# Patient Record
Sex: MALE | Race: WHITE | Employment: FULL TIME | RURAL
[De-identification: names, ages, dates, MRNs, and addresses within clinical notes are randomized per-mention and may not be internally consistent; named-entity substitution may affect disease eponyms.]

---

## 2024-01-06 ENCOUNTER — HOSPITAL ENCOUNTER (EMERGENCY)
Facility: HOSPITAL | Age: 54
Discharge: HOME | End: 2024-01-06
Attending: EMERGENCY MEDICINE
Payer: COMMERCIAL

## 2024-01-06 ENCOUNTER — APPOINTMENT (OUTPATIENT)
Facility: HOSPITAL | Age: 54
End: 2024-01-06
Payer: COMMERCIAL

## 2024-01-06 VITALS
TEMPERATURE: 99.2 F | HEART RATE: 99 BPM | DIASTOLIC BLOOD PRESSURE: 100 MMHG | OXYGEN SATURATION: 98 % | SYSTOLIC BLOOD PRESSURE: 175 MMHG | RESPIRATION RATE: 18 BRPM

## 2024-01-06 DIAGNOSIS — J20.9 ACUTE BRONCHITIS, UNSPECIFIED ORGANISM: Primary | ICD-10-CM

## 2024-01-06 LAB
FLUAV RNA SPEC QL NAA+PROBE: NOT DETECTED
FLUBV RNA SPEC QL NAA+PROBE: NOT DETECTED
SARS-COV-2 RNA RESP QL NAA+PROBE: NOT DETECTED

## 2024-01-06 PROCEDURE — 99284 EMERGENCY DEPT VISIT MOD MDM: CPT

## 2024-01-06 PROCEDURE — 71045 X-RAY EXAM CHEST 1 VIEW: CPT

## 2024-01-06 PROCEDURE — 87636 SARSCOV2 & INF A&B AMP PRB: CPT

## 2024-01-06 RX ORDER — METHYLPREDNISOLONE 4 MG/1
TABLET ORAL
Qty: 1 KIT | Refills: 0 | Status: SHIPPED | OUTPATIENT
Start: 2024-01-06 | End: 2024-01-12

## 2024-01-06 RX ORDER — AZITHROMYCIN 250 MG/1
TABLET, FILM COATED ORAL
Qty: 1 PACKET | Refills: 0 | Status: SHIPPED | OUTPATIENT
Start: 2024-01-06 | End: 2024-01-10

## 2024-01-06 ASSESSMENT — PAIN - FUNCTIONAL ASSESSMENT: PAIN_FUNCTIONAL_ASSESSMENT: NONE - DENIES PAIN

## 2024-01-06 NOTE — ED PROVIDER NOTES
EMERGENCY DEPARTMENT HISTORY AND PHYSICAL EXAM      Date: 1/6/2024  Patient Name: Antione Pastor    History of Presenting Illness     Chief Complaint   Patient presents with   • Cough       History Provided By: Patient    HPI: Antione Pastor, 53 y.o. male with past medical history listed below, presents via private vehicle to the ED with cc of ***        There are no other complaints, changes, or physical findings at this time.    PCP: No primary care provider on file.    No current facility-administered medications on file prior to encounter.     No current outpatient medications on file prior to encounter.       Past History     Past Medical History:  History reviewed. No pertinent past medical history.    Past Surgical History:  History reviewed. No pertinent surgical history.    Family History:  History reviewed. No pertinent family history.    Social History:       Allergies:  No Known Allergies      Review of Systems   Review of Systems    Physical Exam     Physical Exam    ***      Diagnostic Study Results     Labs -     Recent Results (from the past 12 hour(s))   COVID-19 & Influenza Combo    Collection Time: 01/06/24 12:40 PM    Specimen: Nasopharyngeal   Result Value Ref Range    SARS-CoV-2, PCR Not detected NOTD      Rapid Influenza A By PCR Not detected NOTD      Rapid Influenza B By PCR Not detected NOTD         Radiologic Studies -   XR CHEST PORTABLE    (Results Pending)     [unfilled]  [unfilled]      Medical Decision Making   I am the first provider for this patient.    I reviewed the vital signs, available nursing notes, past medical history, past surgical history, family history and social history.    Vital Signs-Reviewed the patient's vital signs.  Patient Vitals for the past 12 hrs:   Temp Pulse Resp BP SpO2   01/06/24 1243 99.2 °F (37.3 °C) 99 (!) 98 (!) 175/100 98 %           Records Reviewed: Nursing notes reviewed    Provider Notes (Medical Decision Making):   DDX:***    ED Course:   Initial assessment

## 2024-01-08 ASSESSMENT — ENCOUNTER SYMPTOMS
ABDOMINAL PAIN: 0
SHORTNESS OF BREATH: 0
DIARRHEA: 0
EYE REDNESS: 0
COUGH: 1
NAUSEA: 0
SORE THROAT: 0
VOMITING: 0

## 2024-05-13 ENCOUNTER — NURSE TRIAGE (OUTPATIENT)
Dept: OTHER | Facility: CLINIC | Age: 54
End: 2024-05-13

## 2024-05-13 NOTE — TELEPHONE ENCOUNTER
[facility]    Received call from Ly at [facility]; Patient with Red Flag Complaint requesting to establish care with Faviola GLASS.    Subjective: Caller states \"irregular heartbeat\"     Current Symptoms: will get intermittent heart racing.  Woke up a couple nights ago with heart beating fast.  Went away.  He is a smoker.  Will happen maybe once a month.  Has also been complaining about right hip pain.    Onset:   ongoing for at least 3 years    Associated Symptoms: NA    Pain Severity:     Temperature:      What has been tried:     LMP: NA Pregnant: NA    Recommended disposition: See in Office Within 2 Weeks    Care advice provided, patient verbalizes understanding; denies any other questions or concerns; instructed to call back for any new or worsening symptoms.    Patient/Caller agrees with recommended disposition; writer provided warm transfer to Anh at [facility] for appointment scheduling    Attention Provider:  Thank you for allowing me to participate in the care of your patient.  The patient was connected to triage in response to information provided to the [facility].  Please do not respond through this encounter as the response is not directed to a shared pool.    Reason for Disposition   Palpitations are a chronic symptom (recurrent or ongoing AND present > 4 weeks)    Protocols used: Heart Rate and Heartbeat Questions-ADULT-[place]

## 2025-01-16 ENCOUNTER — HOSPITAL ENCOUNTER (EMERGENCY)
Facility: HOSPITAL | Age: 55
Discharge: HOME | End: 2025-01-16
Attending: EMERGENCY MEDICINE
Payer: COMMERCIAL

## 2025-01-16 ENCOUNTER — APPOINTMENT (OUTPATIENT)
Facility: HOSPITAL | Age: 55
End: 2025-01-16
Payer: COMMERCIAL

## 2025-01-16 VITALS
HEIGHT: 78 IN | OXYGEN SATURATION: 94 % | HEART RATE: 98 BPM | WEIGHT: 300 LBS | TEMPERATURE: 101.3 F | DIASTOLIC BLOOD PRESSURE: 91 MMHG | RESPIRATION RATE: 16 BRPM | SYSTOLIC BLOOD PRESSURE: 131 MMHG | BODY MASS INDEX: 34.71 KG/M2

## 2025-01-16 DIAGNOSIS — J10.1 INFLUENZA A: Primary | ICD-10-CM

## 2025-01-16 LAB
FLUAV RNA SPEC QL NAA+PROBE: DETECTED
FLUBV RNA SPEC QL NAA+PROBE: NOT DETECTED
SARS-COV-2 RNA RESP QL NAA+PROBE: NOT DETECTED
SOURCE: ABNORMAL

## 2025-01-16 PROCEDURE — 71046 X-RAY EXAM CHEST 2 VIEWS: CPT

## 2025-01-16 PROCEDURE — 87636 SARSCOV2 & INF A&B AMP PRB: CPT

## 2025-01-16 PROCEDURE — 6370000000 HC RX 637 (ALT 250 FOR IP): Performed by: EMERGENCY MEDICINE

## 2025-01-16 PROCEDURE — 99284 EMERGENCY DEPT VISIT MOD MDM: CPT

## 2025-01-16 RX ORDER — IBUPROFEN 600 MG/1
600 TABLET, FILM COATED ORAL
Status: DISCONTINUED | OUTPATIENT
Start: 2025-01-16 | End: 2025-01-16

## 2025-01-16 RX ORDER — ACETAMINOPHEN 500 MG
500 TABLET ORAL
Status: COMPLETED | OUTPATIENT
Start: 2025-01-16 | End: 2025-01-16

## 2025-01-16 RX ORDER — OSELTAMIVIR PHOSPHATE 75 MG/1
75 CAPSULE ORAL 2 TIMES DAILY
Qty: 10 CAPSULE | Refills: 0 | Status: SHIPPED | OUTPATIENT
Start: 2025-01-16 | End: 2025-01-21

## 2025-01-16 RX ADMIN — ACETAMINOPHEN 500 MG: 500 TABLET, FILM COATED ORAL at 21:06

## 2025-01-16 ASSESSMENT — LIFESTYLE VARIABLES
HOW OFTEN DO YOU HAVE A DRINK CONTAINING ALCOHOL: MONTHLY OR LESS
HOW MANY STANDARD DRINKS CONTAINING ALCOHOL DO YOU HAVE ON A TYPICAL DAY: 1 OR 2

## 2025-01-16 ASSESSMENT — PAIN SCALES - GENERAL
PAINLEVEL_OUTOF10: 7
PAINLEVEL_OUTOF10: 7

## 2025-01-16 ASSESSMENT — PAIN - FUNCTIONAL ASSESSMENT: PAIN_FUNCTIONAL_ASSESSMENT: 0-10

## 2025-01-17 NOTE — ED TRIAGE NOTES
Patient came in with c/o fever and cough that started yesterday. Has been fighting it with tylenol. Last dose was at 1900 today 500 mg

## 2025-01-17 NOTE — ED PROVIDER NOTES
the time of this note:     XR CHEST (2 VW)   Final Result   No acute disease. No significant change.         Electronically signed by KEVIN SANCHEZ              EMERGENCY DEPARTMENT COURSE and DIFFERENTIAL DIAGNOSIS/MDM   Vitals:    Vitals:    01/16/25 1957 01/16/25 2014   BP: (!) 131/91    Pulse: 98    Resp: 16    Temp: 99.9 °F (37.7 °C) (!) 101.3 °F (38.5 °C)   TempSrc: Oral Oral   SpO2: 94%    Weight: 136.1 kg (300 lb)    Height: 1.981 m (6' 6\")             Patient was given the following medications:  Medications   acetaminophen (TYLENOL) tablet 500 mg (500 mg Oral Given 1/16/25 2106)           CC/HPI Summary, DDx, ED Course, and Reassessment: 54-year-old male presents to the ED with fever, cough, sore throat that started fairly suddenly last night.  Please see above for full HPI.    On exam, he is febrile to 101.3.  Blood pressure moderately elevated at 131/91.  Sats are normal at 94%.  Respiratory rate is 16.  Heart rate 98.  Lungs with equal breath sounds bilaterally.  Occasional expiratory wheezes.  Regular rate and rhythm.  Abdomen is soft and nontender.  Skin is hot to touch.    Differential diagnosis includes pneumonia, bronchitis, COVID, flu, other viral syndrome.    Will check COVID, flu swabs and chest x-ray to start.  Consider blood work if COVID and flu swabs are negative.    Influenza A positive.  Prescribed Tamiflu as patient presented within 48 hours of onset of symptoms.  Instructed to take Tylenol and drink plenty of fluids.  Return for worsening symptoms.          FINAL IMPRESSION     1. Influenza A          DISPOSITION/PLAN   DISPOSITION Decision To Discharge 01/16/2025 08:48:29 PM   DISPOSITION CONDITION Stable           Discharge     PATIENT REFERRED TO:  [facility] Emergency Department  [address] [address] [phone]    If symptoms worsen         DISCHARGE MEDICATIONS:     Medication List        START taking these medications      oseltamivir 75 MG

## 2025-05-06 ENCOUNTER — OFFICE VISIT (OUTPATIENT)
Age: 55
End: 2025-05-06
Payer: COMMERCIAL

## 2025-05-06 ENCOUNTER — HOSPITAL ENCOUNTER (OUTPATIENT)
Facility: HOSPITAL | Age: 55
Discharge: HOME | End: 2025-05-09
Payer: COMMERCIAL

## 2025-05-06 VITALS
HEIGHT: 78 IN | OXYGEN SATURATION: 97 % | BODY MASS INDEX: 34.8 KG/M2 | WEIGHT: 300.8 LBS | SYSTOLIC BLOOD PRESSURE: 130 MMHG | DIASTOLIC BLOOD PRESSURE: 77 MMHG | HEART RATE: 81 BPM | TEMPERATURE: 98 F | RESPIRATION RATE: 18 BRPM

## 2025-05-06 DIAGNOSIS — Z87.891 SMOKING HX: ICD-10-CM

## 2025-05-06 DIAGNOSIS — Z11.59 ENCOUNTER FOR HEPATITIS C SCREENING TEST FOR LOW RISK PATIENT: ICD-10-CM

## 2025-05-06 DIAGNOSIS — Z12.11 COLON CANCER SCREENING: ICD-10-CM

## 2025-05-06 DIAGNOSIS — N40.0 ENLARGED PROSTATE: ICD-10-CM

## 2025-05-06 DIAGNOSIS — Z87.891 PERSONAL HISTORY OF TOBACCO USE: ICD-10-CM

## 2025-05-06 DIAGNOSIS — Z13.1 SCREENING FOR DIABETES MELLITUS: ICD-10-CM

## 2025-05-06 DIAGNOSIS — R05.8 EXERCISE-INDUCED COUGHING SPELL: ICD-10-CM

## 2025-05-06 DIAGNOSIS — Z88.6: Primary | ICD-10-CM

## 2025-05-06 DIAGNOSIS — Z13.220 SCREENING FOR LIPOID DISORDERS: ICD-10-CM

## 2025-05-06 LAB
BASOPHILS # BLD: 0.05 K/UL (ref 0–0.1)
BASOPHILS NFR BLD: 0.6 % (ref 0–1)
CHOLEST SERPL-MCNC: 183 MG/DL
DIFFERENTIAL METHOD BLD: ABNORMAL
EOSINOPHIL # BLD: 0.2 K/UL (ref 0–0.4)
EOSINOPHIL NFR BLD: 2.4 % (ref 0–7)
ERYTHROCYTE [DISTWIDTH] IN BLOOD BY AUTOMATED COUNT: 12.9 % (ref 11.5–14.5)
GLUCOSE SERPL-MCNC: 79 MG/DL (ref 65–100)
HCT VFR BLD AUTO: 48.5 % (ref 36.6–50.3)
HDLC SERPL-MCNC: 33 MG/DL
HDLC SERPL: 5.5 (ref 0–5)
HGB BLD-MCNC: 16 G/DL (ref 12.1–17)
IMM GRANULOCYTES # BLD AUTO: 0.03 K/UL (ref 0–0.04)
IMM GRANULOCYTES NFR BLD AUTO: 0.4 % (ref 0–0.5)
LDLC SERPL CALC-MCNC: 81 MG/DL (ref 0–100)
LYMPHOCYTES # BLD: 3.49 K/UL (ref 0.8–3.5)
LYMPHOCYTES NFR BLD: 41.1 % (ref 12–49)
MCH RBC QN AUTO: 31 PG (ref 26–34)
MCHC RBC AUTO-ENTMCNC: 33 G/DL (ref 30–36.5)
MCV RBC AUTO: 94 FL (ref 80–99)
MONOCYTES # BLD: 0.42 K/UL (ref 0–1)
MONOCYTES NFR BLD: 4.9 % (ref 5–13)
NEUTS SEG # BLD: 4.3 K/UL (ref 1.8–8)
NEUTS SEG NFR BLD: 50.6 % (ref 32–75)
NRBC # BLD: 0 K/UL (ref 0–0.01)
NRBC BLD-RTO: 0 PER 100 WBC
PLATELET # BLD AUTO: 222 K/UL (ref 150–400)
PMV BLD AUTO: 9.6 FL (ref 8.9–12.9)
RBC # BLD AUTO: 5.16 M/UL (ref 4.1–5.7)
TRIGL SERPL-MCNC: 345 MG/DL
VLDLC SERPL CALC-MCNC: 69 MG/DL
WBC # BLD AUTO: 8.5 K/UL (ref 4.1–11.1)

## 2025-05-06 PROCEDURE — 84153 ASSAY OF PSA TOTAL: CPT

## 2025-05-06 PROCEDURE — 85025 COMPLETE CBC W/AUTO DIFF WBC: CPT

## 2025-05-06 PROCEDURE — G0296 VISIT TO DETERM LDCT ELIG: HCPCS | Performed by: FAMILY MEDICINE

## 2025-05-06 PROCEDURE — 36415 COLL VENOUS BLD VENIPUNCTURE: CPT

## 2025-05-06 PROCEDURE — 99203 OFFICE O/P NEW LOW 30 MIN: CPT | Performed by: FAMILY MEDICINE

## 2025-05-06 PROCEDURE — 80061 LIPID PANEL: CPT

## 2025-05-06 PROCEDURE — 82947 ASSAY GLUCOSE BLOOD QUANT: CPT

## 2025-05-06 RX ORDER — ALBUTEROL SULFATE 90 UG/1
2 INHALANT RESPIRATORY (INHALATION) 4 TIMES DAILY PRN
Qty: 18 G | Refills: 5 | Status: SHIPPED | OUTPATIENT
Start: 2025-05-06

## 2025-05-06 RX ORDER — EPINEPHRINE 0.3 MG/.3ML
0.3 INJECTION SUBCUTANEOUS ONCE
Qty: 0.3 ML | Refills: 0 | Status: SHIPPED | OUTPATIENT
Start: 2025-05-06 | End: 2025-05-06

## 2025-05-06 SDOH — ECONOMIC STABILITY: FOOD INSECURITY: WITHIN THE PAST 12 MONTHS, THE FOOD YOU BOUGHT JUST DIDN'T LAST AND YOU DIDN'T HAVE MONEY TO GET MORE.: NEVER TRUE

## 2025-05-06 SDOH — ECONOMIC STABILITY: FOOD INSECURITY: WITHIN THE PAST 12 MONTHS, YOU WORRIED THAT YOUR FOOD WOULD RUN OUT BEFORE YOU GOT MONEY TO BUY MORE.: NEVER TRUE

## 2025-05-06 ASSESSMENT — PATIENT HEALTH QUESTIONNAIRE - PHQ9
SUM OF ALL RESPONSES TO PHQ QUESTIONS 1-9: 0
1. LITTLE INTEREST OR PLEASURE IN DOING THINGS: NOT AT ALL
2. FEELING DOWN, DEPRESSED OR HOPELESS: NOT AT ALL
SUM OF ALL RESPONSES TO PHQ QUESTIONS 1-9: 0

## 2025-05-06 NOTE — PATIENT INSTRUCTIONS
Please call central scheduling at [phone] to arrange the lung CT study and pulmonary function test.      Learning About Lung Cancer Screening  What is screening for lung cancer?     Lung cancer screening is a way to find some lung cancers early, before a person has any symptoms of the cancer.  Lung cancer screening may help those who have the highest risk for lung cancer--people age 50 and older who are or were heavy smokers. For most people, who aren't at increased risk, screening for lung cancer probably isn't helpful.  Screening won't prevent cancer. And it may not find all lung cancers. Lung cancer screening may lower the risk of dying from lung cancer in a small number of people.  How is it done?  Lung cancer screening is done with a low-dose CT (computed tomography) scan. A CT scan uses X-rays, or radiation, to make detailed pictures of your body. Experts recommend that screening be done in medical centers that focus on finding and treating lung cancer.  Who is screening recommended for?  Lung cancer screening is recommended for people age 50 and older who are or were heavy smokers. That means people with a smoking history of at least 20 pack years. A pack year is a way to measure how heavy a smoker you are or were.  To figure out your pack years, multiply how many packs a day on average (assuming 20 cigarettes per pack) you have smoked by how many years you have smoked. For example:  If you smoked 1 pack a day for 20 years, that's 1 times 20. So you have a smoking history of 20 pack years.  If you smoked 2 packs a day for 10 years, that's 2 times 10. So you have a smoking history of 20 pack years.  Experts agree that screening is for people who have a high risk of lung cancer. But experts don't agree on what high risk means. Some say people age 50 or older with at least a 20-pack-year smoking history are high risk. Others say it's people age 55 or older with a 30-pack-year history.  To see if you could

## 2025-05-06 NOTE — PROGRESS NOTES
Antione Pastor is a 54 y.o. male presenting for/with:    Chief Complaint   Patient presents with    New Patient     Patient states that he can't take ibuprofen. Patient notices fast heartbeat at night. He is also experiencing twitches and hands feel like pins and needles.        Vitals:    05/06/25 1455   BP: 130/77   BP Site: Left Upper Arm   Patient Position: Sitting   BP Cuff Size: Medium Adult   Pulse: 81   Resp: 18   Temp: 98 °F (36.7 °C)   TempSrc: Temporal   SpO2: 97%   Weight: (!) 136.4 kg (300 lb 12.8 oz)   Height: 1.981 m (6' 6\")       Pain Scale: /10  Pain Location:     \"Have you been to the ER, urgent care clinic since your last visit?  Hospitalized since your last visit?\"    NO    “Have you seen or consulted any other health care providers outside of [facility] since your last visit?”    NO    “Have you had a colorectal cancer screening such as a colonoscopy/FIT/Cologuard?    NO    No colonoscopy on file  No cologuard on file  No FIT/FOBT on file   No flexible sigmoidoscopy on file                  5/6/2025     2:54 PM   PHQ-9    Little interest or pleasure in doing things 0   Feeling down, depressed, or hopeless 0   PHQ-2 Score 0   PHQ-9 Total Score 0            No data to display                      No data to display                     No data to display                     No data to display                Advance Care Planning     The patient has appointed the following active healthcare agents:

## 2025-05-06 NOTE — PROGRESS NOTES
Antione Pastor is a 54 y.o. male  Chief Complaint   Patient presents with    New Patient     Patient states that he can't take ibuprofen. Patient notices fast heartbeat at night. He is also experiencing twitches and hands feel like pins and needles.        HPI:  Symptoms include no sx. Needs to establish care and get RF epipen for naproxen allergy with hx anaphylaxis.     Reviewed PMH, PSH, SH, Medications, allergies (see chart).  No current outpatient medications on file.     No current facility-administered medications for this visit.     ROS:   General: No fever, chills, or abnormal weight loss  Respiratory: No cough, dyspnea  CV: No chest pain, palpitations    Objective:  Visit Vitals  /77 (BP Site: Left Upper Arm, Patient Position: Sitting, BP Cuff Size: Medium Adult)   Pulse 81   Temp 98 °F (36.7 °C) (Temporal)   Resp 18   Ht 1.981 m (6' 6\")   Wt (!) 136.4 kg (300 lb 12.8 oz)   SpO2 97%   BMI 34.76 kg/m²     Wt Readings from Last 3 Encounters:   05/06/25 (!) 136.4 kg (300 lb 12.8 oz)   01/16/25 136.1 kg (300 lb)     BP Readings from Last 3 Encounters:   05/06/25 130/77   01/16/25 (!) 131/91   01/06/24 (!) 175/100     Physical Examination: General appearance - alert, overweight, otherwise well appearing, and in no distress  Mental status - alert, oriented to person, place, and time  Eyes - pupils equal and reactive, extraocular eye movements intact  ENT - bilateral external ears and nose normal. 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 RF Epipen. Set up with DR. Duarte for skin testing for ASA, motrin, etc    Smoking  Work on

## 2025-05-07 ENCOUNTER — RESULTS FOLLOW-UP (OUTPATIENT)
Age: 55
End: 2025-05-07

## 2025-05-07 LAB — PSA SERPL-MCNC: 2.5 NG/ML (ref 0.01–4)

## 2025-05-28 ENCOUNTER — HOSPITAL ENCOUNTER (OUTPATIENT)
Facility: HOSPITAL | Age: 55
Discharge: HOME | End: 2025-05-31
Attending: FAMILY MEDICINE
Payer: COMMERCIAL

## 2025-05-28 DIAGNOSIS — Z87.891 PERSONAL HISTORY OF TOBACCO USE: ICD-10-CM

## 2025-05-28 PROCEDURE — 71271 CT THORAX LUNG CANCER SCR C-: CPT

## 2025-06-03 ENCOUNTER — RESULTS FOLLOW-UP (OUTPATIENT)
Age: 55
End: 2025-06-03

## 2025-06-03 PROBLEM — Z72.0 TOBACCO USE: Status: ACTIVE | Noted: 2025-06-03

## 2025-06-03 PROBLEM — I25.10 CORONARY ARTERY CALCIFICATION SEEN ON CAT SCAN: Status: ACTIVE | Noted: 2025-06-03

## 2025-06-03 PROBLEM — J43.8 OTHER EMPHYSEMA (HCC): Status: ACTIVE | Noted: 2025-06-03

## 2025-06-03 NOTE — TELEPHONE ENCOUNTER
Contacted pt to review reports.Will discuss working on his RF mod for CAD, emphysema, and MAFLD at follow up next month. He is working on his allergy eval for NSAIDs.

## 2025-07-15 ENCOUNTER — OFFICE VISIT (OUTPATIENT)
Age: 55
End: 2025-07-15
Payer: COMMERCIAL

## 2025-07-15 VITALS
DIASTOLIC BLOOD PRESSURE: 70 MMHG | WEIGHT: 307.4 LBS | RESPIRATION RATE: 18 BRPM | HEART RATE: 73 BPM | SYSTOLIC BLOOD PRESSURE: 120 MMHG | BODY MASS INDEX: 35.57 KG/M2 | TEMPERATURE: 97.9 F | HEIGHT: 78 IN | OXYGEN SATURATION: 96 %

## 2025-07-15 DIAGNOSIS — I25.10 CORONARY ARTERY CALCIFICATION SEEN ON CAT SCAN: ICD-10-CM

## 2025-07-15 DIAGNOSIS — I25.10 ASCVD (ARTERIOSCLEROTIC CARDIOVASCULAR DISEASE): Primary | ICD-10-CM

## 2025-07-15 DIAGNOSIS — L91.8 SKIN TAG: ICD-10-CM

## 2025-07-15 DIAGNOSIS — E78.2 MIXED HYPERLIPIDEMIA: ICD-10-CM

## 2025-07-15 PROCEDURE — 11200 RMVL SKIN TAGS UP TO&INC 15: CPT | Performed by: FAMILY MEDICINE

## 2025-07-15 PROCEDURE — 99214 OFFICE O/P EST MOD 30 MIN: CPT | Performed by: FAMILY MEDICINE

## 2025-07-15 RX ORDER — LOSARTAN POTASSIUM 25 MG/1
25 TABLET ORAL DAILY
Qty: 30 TABLET | Refills: 11 | Status: SHIPPED | OUTPATIENT
Start: 2025-07-15

## 2025-07-15 RX ORDER — ATORVASTATIN CALCIUM 10 MG/1
10 TABLET, FILM COATED ORAL DAILY
Qty: 30 TABLET | Refills: 11 | Status: SHIPPED | OUTPATIENT
Start: 2025-07-15

## 2025-07-15 NOTE — PROGRESS NOTES
Antione Pastor is a 55 y.o. male presenting for/with:    Chief Complaint   Patient presents with    Discuss Labs       Vitals:    07/15/25 0815   BP: 120/70   BP Site: Left Upper Arm   Patient Position: Sitting   BP Cuff Size: Medium Adult   Pulse: 73   Resp: 18   Temp: 97.9 °F (36.6 °C)   TempSrc: Temporal   SpO2: 96%   Weight: (!) 139.4 kg (307 lb 6.4 oz)   Height: 1.981 m (6' 6\")       Pain Scale: /10  Pain Location:     \"Have you been to the ER, urgent care clinic since your last visit?  Hospitalized since your last visit?\"    NO    “Have you seen or consulted any other health care providers outside of [facility] since your last visit?”    NO    “Have you had a colorectal cancer screening such as a colonoscopy/FIT/Cologuard?    NO    No colonoscopy on file  No cologuard on file  No FIT/FOBT on file   No flexible sigmoidoscopy on file                  5/6/2025     2:54 PM   PHQ-9    Little interest or pleasure in doing things 0   Feeling down, depressed, or hopeless 0   PHQ-2 Score 0   PHQ-9 Total Score 0            No data to display                     7/15/2025     8:14 AM   Amb Fall Risk Assessment and TUG Test   Do you feel unsteady or are you worried about falling?  no   2 or more falls in past year? no   Fall with injury in past year? no           7/15/2025     8:00 AM   ADL ASSESSMENT   Feeding yourself No Help Needed   Getting from bed to chair No Help Needed   Getting dressed No Help Needed   Bathing or showering No Help Needed   Walk across the room (includes cane/walker) No Help Needed   Using the telphone No Help Needed   Taking your medications No Help Needed   Preparing meals No Help Needed   Managing money (expenses/bills) No Help Needed   Moderately strenuous housework (laundry) No Help Needed   Shopping for personal items (toiletries/medicines) No Help Needed   Shopping for groceries No Help Needed   Driving No Help Needed   Climbing a flight of stairs No Help Needed   Getting to

## 2025-07-15 NOTE — PROGRESS NOTES
Antione Pastor is a 55 y.o. male  Chief Complaint   Patient presents with    Discuss Labs     HPI:  Hyperlipidemia and ASCVD.  On TLCs only. The 10-year ASCVD risk score (Yulia DK, et al., 2019) is: 11.4%  Due to discuss tx. POS coronary Ca++ on LDCT.  Lab Results   Component Value Date    CHOL 183 05/06/2025    HDL 33 05/06/2025    LDL 81 05/06/2025    TRIG 345 (H) 05/06/2025     Reviewed PMH, PSH, SH, Medications, allergies (see chart).  Current Outpatient Medications   Medication Sig    albuterol sulfate HFA (VENTOLIN HFA) 108 (90 Base) MCG/ACT inhaler Inhale 2 puffs into the lungs 4 times daily as needed for Wheezing (cough)    EPINEPHrine (EPIPEN 2-SHUBHAM) 0.3 MG/0.3ML SOAJ injection Inject 0.3 mLs into the skin once for 1 dose Use as directed for allergic reaction     No current facility-administered medications for this visit.     ROS:   General: No fever, chills, or abnormal weight loss  Respiratory: No cough, dyspnea  CV: No chest pain, palpitations    Objective:  Visit Vitals  /70 (BP Site: Left Upper Arm, Patient Position: Sitting, BP Cuff Size: Medium Adult)   Pulse 73   Temp 97.9 °F (36.6 °C) (Temporal)   Resp 18   Ht 1.981 m (6' 6\")   Wt (!) 139.4 kg (307 lb 6.4 oz)   SpO2 96%   BMI 35.52 kg/m²     Wt Readings from Last 3 Encounters:   07/15/25 (!) 139.4 kg (307 lb 6.4 oz)   05/06/25 (!) 136.4 kg (300 lb 12.8 oz)   01/16/25 136.1 kg (300 lb)     BP Readings from Last 3 Encounters:   07/15/25 120/70   05/06/25 130/77   01/16/25 (!) 131/91     Physical Examination: General appearance - alert, well appearing overweight M in no distress  Mental status - alert, oriented to person, place, and time  Eyes - pupils equal and reactive, extraocular eye movements intact  ENT - bilateral external ears and nose normal. Normal lips  Neck - supple, no significant adenopathy, no thyromegaly or mass  Chest - clear to auscultation, no wheezes, rales or rhonchi, symmetric air entry  Heart - normal rate, regular